# Patient Record
Sex: FEMALE | Race: WHITE | NOT HISPANIC OR LATINO | Employment: OTHER | URBAN - METROPOLITAN AREA
[De-identification: names, ages, dates, MRNs, and addresses within clinical notes are randomized per-mention and may not be internally consistent; named-entity substitution may affect disease eponyms.]

---

## 2021-04-08 DIAGNOSIS — Z23 ENCOUNTER FOR IMMUNIZATION: ICD-10-CM

## 2023-11-30 ENCOUNTER — OFFICE VISIT (OUTPATIENT)
Age: 73
End: 2023-11-30

## 2023-11-30 VITALS — HEIGHT: 65 IN | BODY MASS INDEX: 32.32 KG/M2 | WEIGHT: 194 LBS

## 2023-11-30 DIAGNOSIS — L28.0 LICHEN SIMPLEX CHRONICUS: ICD-10-CM

## 2023-11-30 DIAGNOSIS — L82.1 SEBORRHEIC KERATOSIS: ICD-10-CM

## 2023-11-30 DIAGNOSIS — Z13.89 SCREENING FOR SKIN CONDITION: Primary | ICD-10-CM

## 2023-11-30 RX ORDER — AMITRIPTYLINE HYDROCHLORIDE 25 MG/1
25 TABLET, FILM COATED ORAL
COMMUNITY

## 2023-11-30 RX ORDER — TRIAMCINOLONE ACETONIDE 1 MG/G
OINTMENT TOPICAL 2 TIMES DAILY
Qty: 30 G | Refills: 1 | Status: CANCELLED | OUTPATIENT
Start: 2023-11-30

## 2023-11-30 RX ORDER — FLUOXETINE HYDROCHLORIDE 20 MG/1
20 CAPSULE ORAL DAILY
COMMUNITY

## 2023-11-30 RX ORDER — PRAVASTATIN SODIUM 40 MG
40 TABLET ORAL DAILY
COMMUNITY

## 2023-11-30 RX ORDER — IBANDRONATE SODIUM 150 MG/1
150 TABLET, FILM COATED ORAL
COMMUNITY
Start: 2018-09-01

## 2023-11-30 NOTE — PATIENT INSTRUCTIONS
LICHEN SIMPLEX CHRONICUS      Assessment and Plan:  Based on a thorough discussion of this condition and the management approach to it (including a comprehensive discussion of the known risks, side effects and potential benefits of treatment), the patient (family) agrees to implement the following specific plan:  Triamcinolone ointment twice a day. Lichen simplex chronicus is a localized area of thickened skin from repeated rubbing, itching, and scratching of the skin. There may be a single or multiple well-circumscribed plaques on the skin. It is also called "neurodermatitis."    What causes lichen simplex chronicus? Although the mechanism is not understood, lichen simplex chronicus follows repetitive scratching and rubbing, which arises because of chronic localized itch. Lichen simplex occurs in adult males and females. It is unusual in children and is more common in people with anxiety and/or obsessive compulsive disorder. The primary itch can be due to: Atopic eczema  Contact eczema  Venous eczema  Psoriasis  Lichen planus  Fungal infection  Insect bite  Neuropathy (radiculopathy) eg, brachioradial pruritus. The itching may involve alterations in the way the nervous system perceives and processes itchy sensations. Skin that tends toward eczematous conditions (eg, atopic dermatitis) is more prone to lichenification. What are the clinical features of lichen simplex? A solitary plaque of lichen simplex is circumscribed, somewhat linear or oval in shape, and markedly thickened. It is intensely itchy. Other features may include:  Exaggerated skin markings  Dry or scaly surface  Leathery induration  Broken-off hairs  Pigmentation  Scratch marks    Lichen simplex is often solitary and unilateral, usually affecting the patient's dominant side. Multiple areas of skin can also be involved with symmetrical or asymmetrical distribution.  It mainly involves easily reached sites, most commonly the legs, arms, neck, upper trunk, and anal region. How do we diagnose lichen simplex chronicus? Diagnosis is usually done by history and skin examination. At times, it may be helpful to do some investigations. These would include:  Skin scrapings for possible tinea (fungal) infection  Skin biopsy  In the absence of known underlying skin problem or infection, you doctor may look for clues that it is due to nerve damage. In severe cases, the following tests may be performed (although they are often unhelpful):  Spinal imaging: X-rays, CT scans, MRI scans  Electrophysiological nerve conduction studies. It is important to understand that the itchy patch of skin is, at least in part, due to scratching and rubbing. Treatment addresses the symptoms and any underlying cause. Treatment of the lichen simplex may include:  Potent topical steroids until the plaque is resolved (4-6 weeks) -- covering the affected area a few hours after application may enhance efficacy  Reduce potency or frequency of topical steroids once lichenification has resolved  Steroid injections every 4-6 weeks  Coal tar preparations  Moisturizers to relieve dryness and reduce desire to scratch  Cooling creams containing menthol  Antihistamine or tricyclic antidepressant medications (eg, amitriptyline or nortriptyline), to help sleep. The primary condition needs treating; for example:  Antifungal agents for dermatophyte infection  Phototherapy and immunomodulatory medications for inflammatory skin conditions (oral corticosteroids, methotrexate, azathioprine or ciclosporin)  Tricyclic antidepressants (amitriptyline, nortriptyline, doxepin), other antidepressants (eg, duloxetine) or antiepileptic medications (valproate, lamotrigine, gabapentin) for nerve causes. SEBORRHEIC KERATOSIS    A seborrheic keratosis is a harmless warty spot that appears during adult life as a common sign of skin aging.   Seborrheic keratoses can arise on any area of skin, covered or uncovered, with the usual exception of the palms and soles. They do not arise from mucous membranes. Seborrheic keratoses can have highly variable appearance. Seborrheic keratoses are extremely common. It has been estimated that over 90% of adults over the age of 61 years have one or more of them. They occur in males and females of all races, typically beginning to erupt in the 35s or 45s. They are uncommon under the age of 21 years. The precise cause of seborrhoeic keratoses is not known. Seborrhoeic keratoses are considered degenerative in nature. As time goes by, seborrheic keratoses tend to become more numerous. Some people inherit a tendency to develop a very large number of them; some people may have hundreds of them. The name "seborrheic keratosis" is misleading, because these lesions are not limited to a seborrhoeic distribution (scalp, mid-face, chest, upper back), nor are they formed from sebaceous glands, nor are they associated with sebum -- which is greasy. Seborrheic keratosis may also be called "SK," "Seb K," "basal cell papilloma," "senile wart," or "barnacle."      There is no easy way to remove multiple lesions on a single occasion. Unless a specific lesion is "inflamed" and is causing pain or stinging/burning or is bleeding, most insurance companies do not authorize treatment.

## 2023-11-30 NOTE — PROGRESS NOTES
[facility] Note     Patient Name: Nicola Isaac  Encounter Date: 11/30/2023     Have you been cared for by a [facility] Dermatologist in the last 3 years and, if so, which description applies to you? NO. I am considered a "new" patient and must complete all patient intake questions. I am FEMALE/of child-bearing potential.    REVIEW OF SYSTEMS:  Have you recently had or currently have any of the following? Recent fever or chills? No  Any non-healing wound? No  Are you pregnant or planning to become pregnant? No  Are you currently or planning to be nursing or breast feeding? No   PAST MEDICAL HISTORY:  Have you personally ever had or currently have any of the following? If "YES," then please provide more detail. Skin cancer (such as Melanoma, Basal Cell Carcinoma, Squamous Cell Carcinoma? No  Tuberculosis, HIV/AIDS, Hepatitis B or C: No  Radiation Treatment No   HISTORY OF IMMUNOSUPPRESSION:   Do you have a history of any of the following:  Systemic Immunosuppression such as Diabetes, Biologic or Immunotherapy, Chemotherapy, Organ Transplantation, Bone Marrow Transplantation? No    Answering "YES" requires the addition of the dotphrase "IMMUNOSUPPRESSED" as the first diagnosis of the patient's visit. FAMILY HISTORY:  Any "first degree relatives" (parent, brother, sister, or child) with the following? Skin Cancer, Pancreatic or Other Cancer? YES, niece- Melanoma. PATIENT EXPERIENCE:    Do you want the Dermatologist to perform a COMPLETE skin exam today including a clinical examination under the "bra and underwear" areas? NO  If necessary, do we have your permission to call and leave a detailed message on your Preferred Phone number that includes your specific medical information? Yes      Not on File No current outpatient medications on file. Whom besides the patient is providing clinical information about today's encounter?    NO ADDITIONAL HISTORIAN (patient alone provided history)      72year old female- new patient presents with a rash of concern in lower legs. Physical Exam and Assessment/Plan by Diagnosis:    LICHEN SIMPLEX CHRONICUS    Physical Exam:  Anatomic Location Affected:  lower legs  Morphological Description:  not active at this time     Additional History of Present Condition:  present for the past months by history. Assessment and Plan:  Based on a thorough discussion of this condition and the management approach to it (including a comprehensive discussion of the known risks, side effects and potential benefits of treatment), the patient (family) agrees to implement the following specific plan:  Triamcinolone ointment twice a day. Lichen simplex chronicus is a localized area of thickened skin from repeated rubbing, itching, and scratching of the skin. There may be a single or multiple well-circumscribed plaques on the skin. It is also called "neurodermatitis."    What causes lichen simplex chronicus? Although the mechanism is not understood, lichen simplex chronicus follows repetitive scratching and rubbing, which arises because of chronic localized itch. Lichen simplex occurs in adult males and females. It is unusual in children and is more common in people with anxiety and/or obsessive compulsive disorder. The primary itch can be due to: Atopic eczema  Contact eczema  Venous eczema  Psoriasis  Lichen planus  Fungal infection  Insect bite  Neuropathy (radiculopathy) eg, brachioradial pruritus. The itching may involve alterations in the way the nervous system perceives and processes itchy sensations. Skin that tends toward eczematous conditions (eg, atopic dermatitis) is more prone to lichenification. What are the clinical features of lichen simplex? A solitary plaque of lichen simplex is circumscribed, somewhat linear or oval in shape, and markedly thickened. It is intensely itchy.  Other features may include:  Exaggerated skin markings  Dry or scaly surface  Leathery induration  Broken-off hairs  Pigmentation  Scratch marks    Lichen simplex is often solitary and unilateral, usually affecting the patient's dominant side. Multiple areas of skin can also be involved with symmetrical or asymmetrical distribution. It mainly involves easily reached sites, most commonly the legs, arms, neck, upper trunk, and anal region. How do we diagnose lichen simplex chronicus? Diagnosis is usually done by history and skin examination. At times, it may be helpful to do some investigations. These would include:  Skin scrapings for possible tinea (fungal) infection  Skin biopsy  In the absence of known underlying skin problem or infection, you doctor may look for clues that it is due to nerve damage. In severe cases, the following tests may be performed (although they are often unhelpful):  Spinal imaging: X-rays, CT scans, MRI scans  Electrophysiological nerve conduction studies. It is important to understand that the itchy patch of skin is, at least in part, due to scratching and rubbing. Treatment addresses the symptoms and any underlying cause. Treatment of the lichen simplex may include:  Potent topical steroids until the plaque is resolved (4-6 weeks) -- covering the affected area a few hours after application may enhance efficacy  Reduce potency or frequency of topical steroids once lichenification has resolved  Steroid injections every 4-6 weeks  Coal tar preparations  Moisturizers to relieve dryness and reduce desire to scratch  Cooling creams containing menthol  Antihistamine or tricyclic antidepressant medications (eg, amitriptyline or nortriptyline), to help sleep.     The primary condition needs treating; for example:  Antifungal agents for dermatophyte infection  Phototherapy and immunomodulatory medications for inflammatory skin conditions (oral corticosteroids, methotrexate, azathioprine or ciclosporin)  Tricyclic antidepressants (amitriptyline, nortriptyline, doxepin), other antidepressants (eg, duloxetine) or antiepileptic medications (valproate, lamotrigine, gabapentin) for nerve causes. SEBORRHEIC KERATOSIS; NON-INFLAMED    Physical Exam:  Anatomic Location Affected:  scattered across trunk, extremities,  face  Morphological Description:  Flat and raised, waxy, smooth to warty textured, yellow to brownish-grey to dark brown to blackish, discrete, "stuck-on" appearing papules. Additional History of Present Condition:  Patient reports new bumps on the skin. Denies itch, burn, pain, bleeding or ulceration. Present constantly; nothing seems to make it worse or better. No prior treatment.       Assessment and Plan:  Based on a thorough discussion of this condition and the management approach to it (including a comprehensive discussion of the known risks, side effects and potential benefits of treatment), the patient (family) agrees to implement the following specific plan:  Reassured benign      Scribe Attestation      I,:  Amor Nunez am acting as a scribe while in the presence of the attending physician.:       I,:  Faby Tilley MD personally performed the services described in this documentation    as scribed in my presence.: